# Patient Record
Sex: MALE | Race: WHITE | NOT HISPANIC OR LATINO | Employment: FULL TIME | URBAN - METROPOLITAN AREA
[De-identification: names, ages, dates, MRNs, and addresses within clinical notes are randomized per-mention and may not be internally consistent; named-entity substitution may affect disease eponyms.]

---

## 2018-02-07 ENCOUNTER — OCCUPATIONAL MEDICINE (OUTPATIENT)
Dept: OCCUPATIONAL MEDICINE | Facility: CLINIC | Age: 73
End: 2018-02-07
Payer: COMMERCIAL

## 2018-02-07 ENCOUNTER — APPOINTMENT (OUTPATIENT)
Dept: RADIOLOGY | Facility: IMAGING CENTER | Age: 73
End: 2018-02-07
Attending: PREVENTIVE MEDICINE
Payer: COMMERCIAL

## 2018-02-07 VITALS
HEART RATE: 84 BPM | SYSTOLIC BLOOD PRESSURE: 146 MMHG | WEIGHT: 250 LBS | TEMPERATURE: 98.2 F | DIASTOLIC BLOOD PRESSURE: 88 MMHG | BODY MASS INDEX: 35 KG/M2 | OXYGEN SATURATION: 90 % | HEIGHT: 71 IN | RESPIRATION RATE: 16 BRPM

## 2018-02-07 DIAGNOSIS — S29.019D THORACIC MYOFASCIAL STRAIN, SUBSEQUENT ENCOUNTER: ICD-10-CM

## 2018-02-07 DIAGNOSIS — S50.12XD CONTUSION OF LEFT FOREARM, SUBSEQUENT ENCOUNTER: ICD-10-CM

## 2018-02-07 DIAGNOSIS — S20.219D CONTUSION OF CHEST WALL, UNSPECIFIED LATERALITY, SUBSEQUENT ENCOUNTER: ICD-10-CM

## 2018-02-07 PROCEDURE — 99204 OFFICE O/P NEW MOD 45 MIN: CPT | Performed by: PREVENTIVE MEDICINE

## 2018-02-07 PROCEDURE — 73090 X-RAY EXAM OF FOREARM: CPT | Mod: TC,LT | Performed by: EMERGENCY MEDICINE

## 2018-02-07 RX ORDER — CYCLOBENZAPRINE HCL 10 MG
10 TABLET ORAL
Qty: 15 TAB | Refills: 0 | Status: SHIPPED | OUTPATIENT
Start: 2018-02-07 | End: 2018-02-20

## 2018-02-07 ASSESSMENT — PAIN SCALES - GENERAL: PAINLEVEL: 10=SEVERE PAIN
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
## 2018-02-20 ENCOUNTER — OCCUPATIONAL MEDICINE (OUTPATIENT)
Dept: OCCUPATIONAL MEDICINE | Facility: CLINIC | Age: 73
End: 2018-02-20
Payer: COMMERCIAL

## 2018-02-20 VITALS
SYSTOLIC BLOOD PRESSURE: 160 MMHG | TEMPERATURE: 99.1 F | DIASTOLIC BLOOD PRESSURE: 100 MMHG | HEART RATE: 92 BPM | RESPIRATION RATE: 16 BRPM | HEIGHT: 71 IN | WEIGHT: 250 LBS | OXYGEN SATURATION: 92 % | BODY MASS INDEX: 35 KG/M2

## 2018-02-20 DIAGNOSIS — S16.1XXD STRAIN OF NECK MUSCLE, SUBSEQUENT ENCOUNTER: ICD-10-CM

## 2018-02-20 DIAGNOSIS — S20.219D CONTUSION OF CHEST WALL, UNSPECIFIED LATERALITY, SUBSEQUENT ENCOUNTER: ICD-10-CM

## 2018-02-20 DIAGNOSIS — S29.019D THORACIC MYOFASCIAL STRAIN, SUBSEQUENT ENCOUNTER: ICD-10-CM

## 2018-02-20 PROCEDURE — 99214 OFFICE O/P EST MOD 30 MIN: CPT | Performed by: PREVENTIVE MEDICINE

## 2018-02-20 RX ORDER — DICLOFENAC SODIUM 75 MG/1
75 TABLET, DELAYED RELEASE ORAL 2 TIMES DAILY
Qty: 60 TAB | Refills: 0 | Status: SHIPPED | OUTPATIENT
Start: 2018-02-20

## 2018-02-20 RX ORDER — TIZANIDINE 4 MG/1
4 TABLET ORAL
Qty: 20 TAB | Refills: 0 | Status: SHIPPED | OUTPATIENT
Start: 2018-02-20

## 2018-02-20 ASSESSMENT — ENCOUNTER SYMPTOMS
PALPITATIONS: 0
TINGLING: 0
SPUTUM PRODUCTION: 0
SHORTNESS OF BREATH: 0
COUGH: 0
SENSORY CHANGE: 0

## 2018-02-20 ASSESSMENT — PAIN SCALES - GENERAL: PAINLEVEL: 10=SEVERE PAIN
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
## 2018-03-13 ENCOUNTER — OCCUPATIONAL MEDICINE (OUTPATIENT)
Dept: OCCUPATIONAL MEDICINE | Facility: CLINIC | Age: 73
End: 2018-03-13
Payer: COMMERCIAL

## 2018-03-13 VITALS
OXYGEN SATURATION: 93 % | RESPIRATION RATE: 16 BRPM | HEART RATE: 69 BPM | BODY MASS INDEX: 35.79 KG/M2 | WEIGHT: 250 LBS | SYSTOLIC BLOOD PRESSURE: 132 MMHG | DIASTOLIC BLOOD PRESSURE: 80 MMHG | HEIGHT: 70 IN

## 2018-03-13 DIAGNOSIS — S29.019D THORACIC MYOFASCIAL STRAIN, SUBSEQUENT ENCOUNTER: ICD-10-CM

## 2018-03-13 DIAGNOSIS — S16.1XXD STRAIN OF NECK MUSCLE, SUBSEQUENT ENCOUNTER: ICD-10-CM

## 2018-03-13 DIAGNOSIS — S20.219D CONTUSION OF CHEST WALL, UNSPECIFIED LATERALITY, SUBSEQUENT ENCOUNTER: ICD-10-CM

## 2018-03-13 PROCEDURE — 99213 OFFICE O/P EST LOW 20 MIN: CPT | Performed by: PREVENTIVE MEDICINE

## 2018-03-13 ASSESSMENT — ENCOUNTER SYMPTOMS
NECK PAIN: 1
PALPITATIONS: 0
DIZZINESS: 0
FOCAL WEAKNESS: 0
HEADACHES: 0
SHORTNESS OF BREATH: 0
SENSORY CHANGE: 0
TINGLING: 0

## 2018-03-13 ASSESSMENT — PAIN SCALES - GENERAL: PAINLEVEL: 7=MODERATE-SEVERE PAIN
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
## 2018-04-03 ENCOUNTER — OCCUPATIONAL MEDICINE (OUTPATIENT)
Dept: OCCUPATIONAL MEDICINE | Facility: CLINIC | Age: 73
End: 2018-04-03
Payer: COMMERCIAL

## 2018-04-03 VITALS
WEIGHT: 260 LBS | RESPIRATION RATE: 14 BRPM | HEART RATE: 86 BPM | HEIGHT: 70 IN | DIASTOLIC BLOOD PRESSURE: 90 MMHG | SYSTOLIC BLOOD PRESSURE: 158 MMHG | BODY MASS INDEX: 37.22 KG/M2

## 2018-04-03 DIAGNOSIS — S16.1XXD STRAIN OF NECK MUSCLE, SUBSEQUENT ENCOUNTER: ICD-10-CM

## 2018-04-03 DIAGNOSIS — S20.219D CONTUSION OF CHEST WALL, UNSPECIFIED LATERALITY, SUBSEQUENT ENCOUNTER: ICD-10-CM

## 2018-04-03 DIAGNOSIS — S29.019D THORACIC MYOFASCIAL STRAIN, SUBSEQUENT ENCOUNTER: ICD-10-CM

## 2018-04-03 PROCEDURE — 99214 OFFICE O/P EST MOD 30 MIN: CPT | Performed by: PREVENTIVE MEDICINE

## 2018-04-03 ASSESSMENT — ENCOUNTER SYMPTOMS
TINGLING: 0
SENSORY CHANGE: 0
FOCAL WEAKNESS: 0
HEADACHES: 0
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
## 2018-04-13 ENCOUNTER — APPOINTMENT (OUTPATIENT)
Dept: PHYSICAL THERAPY | Facility: REHABILITATION | Age: 73
End: 2018-04-13
Attending: PREVENTIVE MEDICINE
Payer: COMMERCIAL

## 2018-04-16 ENCOUNTER — PHYSICAL THERAPY (OUTPATIENT)
Dept: PHYSICAL THERAPY | Facility: REHABILITATION | Age: 73
End: 2018-04-16
Attending: PREVENTIVE MEDICINE
Payer: COMMERCIAL

## 2018-04-16 DIAGNOSIS — M54.2 NECK PAIN: ICD-10-CM

## 2018-04-16 DIAGNOSIS — M54.6 THORACIC SPINE PAIN: ICD-10-CM

## 2018-04-16 PROCEDURE — 97014 ELECTRIC STIMULATION THERAPY: CPT

## 2018-04-16 PROCEDURE — 97162 PT EVAL MOD COMPLEX 30 MIN: CPT

## 2018-04-16 PROCEDURE — 97140 MANUAL THERAPY 1/> REGIONS: CPT

## 2018-04-16 ASSESSMENT — ENCOUNTER SYMPTOMS
ALLEVIATING FACTORS: PAIN MEDICATION
PAIN TIMING: WHEN ACTIVE
QUALITY: BURNING
EXACERBATED BY: LIFTING
PAIN TIMING: EVERY EVENING
EXACERBATED BY: PROLONGED SITTING
PAIN SCALE AT LOWEST: 3
PAIN SCALE: 5
QUALITY: SHARP
PAIN SCALE AT HIGHEST: 8
EXACERBATED BY: CARRYING
ALLEVIATING FACTORS: HEAT APPLICATION
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
## 2018-04-19 ENCOUNTER — PHYSICAL THERAPY (OUTPATIENT)
Dept: PHYSICAL THERAPY | Facility: REHABILITATION | Age: 73
End: 2018-04-19
Attending: PREVENTIVE MEDICINE
Payer: COMMERCIAL

## 2018-04-19 DIAGNOSIS — M54.6 THORACIC SPINE PAIN: ICD-10-CM

## 2018-04-19 DIAGNOSIS — M54.2 NECK PAIN: ICD-10-CM

## 2018-04-19 PROCEDURE — 97014 ELECTRIC STIMULATION THERAPY: CPT

## 2018-04-19 PROCEDURE — 97110 THERAPEUTIC EXERCISES: CPT

## 2018-04-19 NOTE — OP THERAPY DAILY TREATMENT
"  Outpatient Physical Therapy  DAILY TREATMENT     [facility] Physical [address].  [address]  [place] [address]  Phone:  [phone]  Fax:  [phone]    Date: 04/19/2018    Patient: Julito Riley  YOB: 1945  MRN: [id number]     Time Calculation  Start time: 0830  Stop time: 0920 Time Calculation (min): 50 minutes     Chief Complaint: Neck Pain    Visit #: 2    SUBJECTIVE:  Pt reports tightness and cramping across neck and upper back w/sharper pain along L UT. Stim helped through the evening after last session.    OBJECTIVE:    Therapeutic Exercises (CPT 96954):     1. Nu Step, x10 min, L1    2. Foam Roller (soft), pec stretch, alt UE OH and lat    3. Rows w/blue TB, x20    4. [place] at wall on 1/2 foam, x10    Therapeutic Treatments and Modalities:     1. E Stim Unattended (CPT 06452), IFC and MHP neck/upper back, x15 min    Time-based treatments/modalities:  Therapeutic exercise minutes (CPT 92361): 30 minutes       Pain rating before treatment: 4  Pain rating after treatment: 4    ASSESSMENT:   Pt cont w/sxs consistent w/muscular strain and possible costochondritis. After re-reading radiology report, noted \"NO DISPLACED\" sternal fx vs previously documented \"non-displaced\" fx.    PLAN/RECOMMENDATIONS:   Plan for treatment: therapy treatment to continue next visit. Pt has a difficult work schedule and can only schedule a few days in advance. Foam Roller, T/S 3D mobility, Scap stab.      "

## 2018-04-24 ENCOUNTER — OCCUPATIONAL MEDICINE (OUTPATIENT)
Dept: OCCUPATIONAL MEDICINE | Facility: CLINIC | Age: 73
End: 2018-04-24
Payer: COMMERCIAL

## 2018-04-24 VITALS
BODY MASS INDEX: 37.22 KG/M2 | DIASTOLIC BLOOD PRESSURE: 82 MMHG | HEIGHT: 70 IN | HEART RATE: 71 BPM | SYSTOLIC BLOOD PRESSURE: 142 MMHG | WEIGHT: 260 LBS | RESPIRATION RATE: 16 BRPM | OXYGEN SATURATION: 95 %

## 2018-04-24 DIAGNOSIS — S20.219D CONTUSION OF CHEST WALL, UNSPECIFIED LATERALITY, SUBSEQUENT ENCOUNTER: ICD-10-CM

## 2018-04-24 DIAGNOSIS — S16.1XXD STRAIN OF NECK MUSCLE, SUBSEQUENT ENCOUNTER: ICD-10-CM

## 2018-04-24 PROCEDURE — 99214 OFFICE O/P EST MOD 30 MIN: CPT | Performed by: PREVENTIVE MEDICINE

## 2018-04-24 ASSESSMENT — ENCOUNTER SYMPTOMS
FOCAL WEAKNESS: 0
SENSORY CHANGE: 0
TINGLING: 1
SHORTNESS OF BREATH: 0
PALPITATIONS: 0

## 2018-04-24 ASSESSMENT — PAIN SCALES - GENERAL: PAINLEVEL: 4=SLIGHT-MODERATE PAIN
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
## 2018-04-26 ENCOUNTER — PHYSICAL THERAPY (OUTPATIENT)
Dept: PHYSICAL THERAPY | Facility: REHABILITATION | Age: 73
End: 2018-04-26
Attending: PREVENTIVE MEDICINE
Payer: COMMERCIAL

## 2018-04-26 DIAGNOSIS — M54.6 THORACIC SPINE PAIN: ICD-10-CM

## 2018-04-26 PROCEDURE — 97110 THERAPEUTIC EXERCISES: CPT

## 2018-04-26 PROCEDURE — 97140 MANUAL THERAPY 1/> REGIONS: CPT

## 2018-04-26 PROCEDURE — 97012 MECHANICAL TRACTION THERAPY: CPT

## 2018-04-26 NOTE — OP THERAPY DAILY TREATMENT
Outpatient Physical Therapy  DAILY TREATMENT     [facility] Physical [address].  [address]  [place] [address]  Phone:  [phone]  Fax:  [phone]    Date: 04/26/2018    Patient: Julito Riley  YOB: 1945  MRN: [id number]     Time Calculation  Start time: 0800  Stop time: 0850 Time Calculation (min): 50 minutes     Chief Complaint: No chief complaint on file.    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 Nu Step, x10 min, L1    2. Half roller on wall, pec stretch, alt UE OH and lat    3. Rows w/blue TB, x20    4. Low trap single arm ys, 2 x 5 BUE    5. Prone scapular retraction cervical neutral, x 15    Therapeutic Treatments and Modalities:     1. Mechanical Traction (CPT 78909), cervical mech traction 17/8 60/20 with MHP, x15 min    2. Manual Therapy (CPT 42362), GR 2-3 PA CT junction, STM upper thoracic paraspinals    Time-based treatments/modalities:  Manual therapy minutes (CPT 93168): 10 minutes  Therapeutic exercise minutes (CPT 26575): 20 minutes       Pain rating before treatment: 4  Pain rating after treatment: 4    ASSESSMENT: Increased tightness and soreness post mechanical  traction.  Hypomobile T spine with decreased extension ROM.  Increased cervical pain with overhead shoulder flexion prone scaption   Response to treatment:  PLAN/RECOMMENDATIONS:   Plan for treatment: therapy treatment to continue next visit. Progress T-spine mobility/extension, scap thoracic strength  Planned interventions for next visit: continue with current treatment.

## 2018-05-01 ENCOUNTER — PHYSICAL THERAPY (OUTPATIENT)
Dept: PHYSICAL THERAPY | Facility: REHABILITATION | Age: 73
End: 2018-05-01
Attending: PREVENTIVE MEDICINE
Payer: COMMERCIAL

## 2018-05-01 DIAGNOSIS — M54.2 NECK PAIN: ICD-10-CM

## 2018-05-01 DIAGNOSIS — M54.6 THORACIC SPINE PAIN: ICD-10-CM

## 2018-05-01 PROCEDURE — 97014 ELECTRIC STIMULATION THERAPY: CPT

## 2018-05-01 PROCEDURE — 97110 THERAPEUTIC EXERCISES: CPT

## 2018-05-01 PROCEDURE — 97140 MANUAL THERAPY 1/> REGIONS: CPT

## 2018-05-01 NOTE — OP THERAPY DAILY TREATMENT
Outpatient Physical Therapy  DAILY TREATMENT     [facility] Physical [address].  [address]  [place] [address]  Phone:  [phone]  Fax:  [phone]    Date: 05/01/2018    Patient: Julito Riley  YOB: 1945  MRN: [id number]     Time Calculation  Start time: 0920          Chief Complaint: No chief complaint on file.    Visit #: 4    SUBJECTIVE:  Increased intensity of symptoms post traction including increased sternal pain and left left sided cervical pain  Been working long hours as  the past week      OBJECTIVE:  Current objective measures:           Therapeutic Exercises (CPT 13677):     1. Nu Step, x10 min, level 3    2. Half roller on wall, pec stretch, alt UE OH and lat    5. Prone scapular retraction cervical neutral, x 15    6. Foam roller pec stretch , alt shouder flex, horizontal, 2 x 30 sec pec stretch, 1 x 30    7. Snow ngels on pink roller, 1 x 15    Therapeutic Treatments and Modalities:     1. E Stim Unattended (CPT 67008), ifc with mhp upper traps cervical paraspinals, x15 min    2. Manual Therapy (CPT 36535), GR 2-3 PA CT junction, STM upper thoracic paraspinals    Time-based treatments/modalities:          Pain rating before treatment: 6  Pain rating after treatment: 6    ASSESSMENT:   Response to treatment: decreased intensity of symptoms with nu step and foam roller activity. Tenderness c6-T4 anterior sternal pain and minimal swelling manibrium. Upper trap dominance poor thoracic mobility into extension.    PLAN/RECOMMENDATIONS:   Plan for treatment: therapy treatment to continue next visit. Progress home program thoracic extension.  Planned interventions for next visit: continue with current treatment.

## 2018-05-03 ENCOUNTER — APPOINTMENT (OUTPATIENT)
Dept: PHYSICAL THERAPY | Facility: REHABILITATION | Age: 73
End: 2018-05-03
Attending: PREVENTIVE MEDICINE
Payer: COMMERCIAL

## 2018-05-22 ENCOUNTER — OCCUPATIONAL MEDICINE (OUTPATIENT)
Dept: OCCUPATIONAL MEDICINE | Facility: CLINIC | Age: 73
End: 2018-05-22
Payer: COMMERCIAL

## 2018-05-22 VITALS
DIASTOLIC BLOOD PRESSURE: 100 MMHG | OXYGEN SATURATION: 93 % | SYSTOLIC BLOOD PRESSURE: 150 MMHG | TEMPERATURE: 98.4 F | HEART RATE: 72 BPM

## 2018-05-22 DIAGNOSIS — S16.1XXD STRAIN OF NECK MUSCLE, SUBSEQUENT ENCOUNTER: ICD-10-CM

## 2018-05-22 DIAGNOSIS — S20.219D CONTUSION OF CHEST WALL, UNSPECIFIED LATERALITY, SUBSEQUENT ENCOUNTER: ICD-10-CM

## 2018-05-22 PROCEDURE — 99213 OFFICE O/P EST LOW 20 MIN: CPT | Performed by: PREVENTIVE MEDICINE

## 2018-05-22 ASSESSMENT — ENCOUNTER SYMPTOMS
TINGLING: 0
SENSORY CHANGE: 0

## 2018-05-22 ASSESSMENT — PAIN SCALES - GENERAL: PAINLEVEL: NO PAIN
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
## 2018-06-19 ENCOUNTER — OCCUPATIONAL MEDICINE (OUTPATIENT)
Dept: OCCUPATIONAL MEDICINE | Facility: CLINIC | Age: 73
End: 2018-06-19
Payer: COMMERCIAL

## 2018-06-19 VITALS
OXYGEN SATURATION: 94 % | HEART RATE: 76 BPM | SYSTOLIC BLOOD PRESSURE: 150 MMHG | DIASTOLIC BLOOD PRESSURE: 100 MMHG | TEMPERATURE: 97.2 F

## 2018-06-19 DIAGNOSIS — S20.219D CONTUSION OF CHEST WALL, UNSPECIFIED LATERALITY, SUBSEQUENT ENCOUNTER: ICD-10-CM

## 2018-06-19 DIAGNOSIS — S16.1XXD STRAIN OF NECK MUSCLE, SUBSEQUENT ENCOUNTER: ICD-10-CM

## 2018-06-19 PROCEDURE — 99213 OFFICE O/P EST LOW 20 MIN: CPT | Performed by: PREVENTIVE MEDICINE

## 2018-06-19 ASSESSMENT — ENCOUNTER SYMPTOMS
TINGLING: 1
SENSORY CHANGE: 1
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
## 2021-01-15 DIAGNOSIS — Z23 NEED FOR VACCINATION: ICD-10-CM
